# Patient Record
(demographics unavailable — no encounter records)

---

## 2017-12-12 NOTE — RAD
AP VIEW OF THE CHEST:

 

INDICATIONS:

Chest pain.  Left arm tingling.

 

COMPARISON:

Prior exam dated 02/02/2011.

 

IMPRESSION:

No acute cardiopulmonary abnormality.  The examination is not appreciably changed from the comparison
 examination.

 

POS: [facility]

## 2018-03-02 NOTE — RAD
PORTABLE AP CHEST X-RAY

3/2/18

 

HISTORY: 

Cough.

 

COMPARISON:  

12/12/17.

 

FINDINGS:  

The cardiac silhouette and pulmonary vasculature are within normal limits for the portable technique 
of the study. The lungs are clear. Osseous structures are intact. 

 

IMPRESSION:  

No acute cardiopulmonary process. 

 

POS: [facility]

## 2018-03-03 NOTE — PDOC.PN
- Subjective


Encounter Start Date: 03/03/18


Encounter Start Time: 10:00





Patient seen and examined. Productive cough with intermittent wheezing. Fever +

. No overnight events





- Objective


Resuscitation Status: 


 











Resuscitation Status           FULL:Full Resuscitation














MAR Reviewed: Yes


Vital Signs & Weight: 


 Vital Signs (12 hours)











  Temp Pulse Resp BP Pulse Ox


 


 03/03/18 11:20  98.7 F  88  16   95


 


 03/03/18 11:19  98.7 F  88  16  127/82  95


 


 03/03/18 08:30  99.2 F  87  16  


 


 03/03/18 07:12  99.2 F  87  16  124/70  95








 Weight











Weight                         172 lb 5 oz














I&O: 


 











 03/02/18 03/03/18 03/04/18





 06:59 06:59 06:59


 


Intake Total  1090 1685


 


Output Total   500


 


Balance  1090 1185











Result Diagrams: 


 03/03/18 04:10





 03/03/18 04:10


Additional Labs: 


 Accuchecks











  03/03/18 03/03/18 03/03/18





  15:42 11:54 05:53


 


POC Glucose  217 H  92  133 H








 Selected Entries











  03/02/18 03/02/18 03/02/18





  21:26 22:20 23:41


 


Temperature 100.8 F H 100.8 F H 100.6 F H














  03/03/18





  03:26


 


Temperature 99.9 F H











Radiology Reviewed by me: Yes (CXR - no definite infiltrate)


EKG Reviewed by me: Yes (Tele SR)





Phys Exam





- Physical Examination


Constitutional: NAD (ill appearing)


Bibasilar rales/rhonchi. Scat wheezing


Cardiovascular: RRR, no rub


Gastrointestinal: soft, non-tender, positive bowel sounds


Musculoskeletal: no edema


Neurological: moves all 4 limbs





Dx/Plan





- Plan


DVT proph w/SCDs





IMPRESSION:


1. Sepsis due to suspected Pneumonia ?Pneumococcal


2. Recent Influenza A infection - Tamiflu completed this morning


3. DM2


4. GERD


5. Hypothyroidism


6. Hypokalemia





PLAN:


* Cont Levaquin/Rocephin


* Resp viral PCR pending


* AM labs


* CXR in AM


* Cont insulin with sliding scale


* Cont other meds as below


* Replace Potassium


* Change to inpt


* Cont IVF








Review of Systems





- Review of Systems


Constitutional: fever, weakness


Cardiovascular: negative: chest pain, palpitations, orthopnea, paroxysmal 

nocturnal dyspnea, edema, light headedness


Gastrointestinal: negative: Nausea, Vomiting, Abdominal Pain, Diarrhea, 

Constipation, Melena, Hematochezia





- Medications/Allergies


Allergies/Adverse Reactions: 


 Allergies











Allergy/AdvReac Type Severity Reaction Status Date / Time


 


No Known Drug Allergies Allergy   Verified 03/02/18 22:14











Medications: 


 Current Medications





Acetaminophen (Tylenol)  650 mg PO Q4H PRN


   PRN Reason: Headache/Fever or Pain


Hydrocodone Bitart/Acetaminophen (Norco 5/325)  1 tab PO Q4H PRN


   PRN Reason: Moderate Pain (4-6)


Al Hydroxide/Mg Hydroxide (Maalox)  30 ml PO Q6H PRN


   PRN Reason: Heartburn  or Indigestion


Benzonatate (Tessalon)  100 mg PO Q4H PRN


   PRN Reason: Cough


   Last Admin: 03/03/18 15:53 Dose:  100 mg


Dextrose/Water (Dextrose 50%)  25 gm SLOW IVP PRN PRN


   PRN Reason: Hypoglycemia


Glucagon (Glucagon)  1 mg IM PRN PRN


   PRN Reason: Hypoglycemia


Guaifenesin/Dextromethorphan (Robitussin Dm)  15 ml PO Q4H PRN


   PRN Reason: Cough


Levofloxacin 500 mg/ Device  100 mls @ 100 mls/hr IVPB 0900 [facility]


   Last Admin: 03/03/18 08:35 Dose:  100 mls


Sodium Chloride (Normal Saline 0.9%)  1,000 mls @ 100 mls/hr IV .Q10H [facility]


   Last Admin: 03/03/18 10:20 Dose:  1,000 mls


Dextrose/Water (D5w)  1,000 mls @ 0 mls/hr IV .Q0M PRN; As Directed


   PRN Reason: Hypoglycemia


Ceftriaxone Sodium 1 gm/ (Syringe 0.4 ml/ Sterile Water)  10 mls @ 120 mls/hr 

SLOW IVP 2000 [facility]


Levothyroxine Sodium (Synthroid)  100 mcg PO 0600 [facility]


   Last Admin: 03/03/18 05:53 Dose:  100 mcg


Magnesium Hydroxide (Milk Of Magnesium)  30 ml PO DAILYPRN PRN


   PRN Reason: Constipation


Ondansetron HCl (Zofran)  4 mg IVP Q6H PRN


   PRN Reason: Nausea/Vomiting


(Insulin Degludec [ Tresiba Flextouch U- 100] 30 Unit)  0 each SC DAILY [facility]


   Last Admin: 03/03/18 08:34 Dose:  1 each


Dexilant  0 each PO DAILY [facility]


Humalog Pen Patient' (s Home Medication)  0 each SC PRN PRN


   PRN Reason: PERSONAL SLIDING SCALE


   Last Admin: 03/03/18 16:21 Dose:  1 each


Senna (Senokot)  2 tab PO HSPRN PRN


   PRN Reason: Constipation


Valsartan (Diovan)  80 mg PO DAILY [facility]


   Last Admin: 03/03/18 08:33 Dose:  80 mg

## 2018-03-03 NOTE — HP
DATE OF SERVICE:  03/02/2018

 

PRIMARY CARE PHYSICIAN:  Christina Regalado MD

 

REASON FOR ADMISSION:  Acute bronchitis.

 

HISTORY OF PRESENT ILLNESS:  A 43-year-old male with a history of diabetes, hypertension, gastroesoph
ageal reflux disease, and hypothyroidism who presented to emergency room for evaluation of fever.  Ang walker reports that in his family, patient's daughter got sick and he was having flu-like illness and 
subsequently he got sick and he was also experiencing flu-like illness.  On Monday, he was diagnosed 
with influenza A and he was started on Tamiflu.  Tomorrow morning, he will finish his Tamiflu course.
  Despite Tamiflu taking, he was having on and off fever.  Patient saw his primary care physician aga
in a couple of days ago.  At that time, patient was given a dose of Rocephin and he was instructed th
at if he gets fever, then he needs to go to emergency room.  Today, he was having high-grade fever wi
th chills, body ache, and headache.  He was feeling fatigue and that is why he decided to come to the
 emergency room for evaluation.  In the emergency room, this patient had chest x-ray, which did not s
how any acute process and routine blood test was unremarkable.

 

Patient denies any UTI symptoms.  He denies any constipation, diarrhea, melena, or hematochezia.  He 
does have dry cough.  Patient also has cough with deep breathing.  Patient denies any pleurisy.  Paola
ent denies any chest pain, palpitation, or dizziness.  He denies any diarrhea, nausea, or vomiting.  
He denies any sore throat.  He denies any hoarseness of voice.  He denies any headache.

 

ALLERGIES:  No known drug allergy.

 

CURRENT HOME MEDICATIONS:  Synthroid 100 mcg p.o. daily, Tamiflu 75 mg p.o. b.i.d., Diovan 80 mg p.o.
 daily, Humalog insulin as per sliding scale, Tresiba 30 units subcu daily, Dexilant 60 mg p.o. daily
.

 

REVIEW OF SYSTEMS:  The following complete review of systems was negative, unless otherwise mentioned
 in the HPI or below:  Constitutional:  Weight loss or gain, ability to conduct usual activities.  Sk
in:  Rash, itching.  Eyes:  Double vision, pain.  ENT/Mouth:  Nose bleeding, neck stiffness, pain, te
nderness.  Cardiovascular:  Palpitations, dyspnea on exertion, orthopnea.  Respiratory:  Shortness of
 breath, wheezing, cough, hemoptysis, fever or night sweats.  Gastrointestinal:  Poor appetite, abdom
inal pain, heartburn, nausea, vomiting, constipation, or diarrhea.  Genitourinary:  Urgency, frequenc
y, dysuria, nocturia.  Musculoskeletal:  Pain, swelling.  Neurologic/Psychiatric:  Anxiety, depressio
n.  Allergy/Immunologic:  Skin rash, bleeding tendency.  Please my HPI for pertinent positive and neg
ative.

 

PAST MEDICAL HISTORY:  Diabetes type 2, hypertension, hypothyroidism, gastroesophageal reflux disease
.

 

PAST SURGICAL HISTORY:  [place] teeth removal, left fifth digit surgery, tonsillectomy, vasectomy, tin
noidectomy.

 

PAST PSYCHIATRIC HISTORY:  Reviewed and negative.

 

SOCIAL HISTORY:  Patient lives at home with his wife.  No history of tobacco, alcohol, or illicit neptali
g abuse.

 

FAMILY HISTORY:  No strong family history of premature coronary artery disease, stroke, or cancer.

 

EMERGENCY ROOM COURSE:  Patient is given Rocephin, azithromycin, IV fluid.

 

PHYSICAL EXAMINATION:

VITAL SIGNS:  On arrival, blood pressure 113/70, pulse 105, respiratory rate 18, temperature 99.4, sa
turation 98% on room air, weight 78.4 kilograms.

GENERAL:  Patient is currently alert, awake, in no obvious acute distress.

HEAD:  Normocephalic, atraumatic.

EYES:  Pupils round, reactive to light.  Extraocular muscle intact.

ENT:  Oropharynx within normal limit.  Moist mucous membranes.  No oral lesion, no pharyngeal erythem
a, no exudate.

NECK:  Supple.  No JVD, no thyromegaly, no carotid bruit, no jugular venous distention.

LUNGS:  Left-sided few basal rales noted.  No wheeze, no rhonchi, no accessory muscles of respiration
 in use.

CARDIAC:  S1, S2 regular.  No murmur, no gallop, no rub.

ABDOMEN:  Soft, bowel sounds present, nontender, nondistended.  No organomegaly, no mass, no suprapub
ic tenderness.

BACK:  Unremarkable.  No CVA tenderness.

EXTREMITIES:  Upper extremity:  Passive movement of all joints are normal.  Lower extremities:  No ed
ema.  Good peripheral pulsation.

SKIN:  No skin rash.

HEMATOLOGIC:  No lymphadenopathy.

NEUROLOGIC:  Nonfocal examination.

 

SIGNIFICANT LABORATORY DATA:  CBC, WBC 7.8, hemoglobin 16.1, platelet 169,000.  VBG, pH 7.42, bicarbo
sumi 26.4, CO2 of 40, O2 of 64.  BMP, sodium 137, potassium 3.8, chloride 102, carbon dioxide 24, BUN
 10, creatinine 0.81.  Glucose 164, calcium 8.8.  Lactic acid 1.4.  LFT, AST 12, ALT 16, alkaline west
sphatase 72, albumin 3.9.

 

ASSESSMENT AND PLAN:

1.  Acute bronchitis/early community-acquired pneumonia after influenza.  The patient will be given R
ocephin and levofloxacin while in hospital, symptomatic treatment for cough with Robitussin and Pamela
duncan.

2.  Gastroesophageal reflux disease.  We will continue Pepcid 20 mg p.o. b.i.d.

3.  Diabetes type 2.  We will continue insulin as per sliding scale per protocol.  Diabetic diet will
 be given and we will continue insulin as per sliding scale.

4.  Hypertension.  If blood pressure permits, then we will continue valsartan 80 mg p.o. daily.

5.  Hypothyroidism.  Continue Synthroid 100 mcg p.o. daily.

6.  Current history of influenza A.  Continue Tamiflu 75 mg p.o. daily, tomorrow morning is a last do
se of Tamiflu.

 

If patient remains afebrile, then we will consider discharging him home on p.o. Levaquin therapy.  De
ep venous thrombosis prophylaxis not needed because we are expecting discharge in 24 hours.  Gastroin
testinal prophylaxis, Pepcid 20 mg p.o. b.i.d.

 

CODE STATUS:  The patient is FULL CODE.  The patient is making decision by himself.

 

Disposition plan based on clinical course.  We are expecting patient's stay in the hospital 24 hours.
  Plan of care discussed with the patient and family member at bedside.

## 2018-03-04 NOTE — RAD
PA AND LATERAL OF THE CHEST:

 

INDICATION: 

Concern for pneumonia.

 

COMPARISON: 

Prior exam dated 3/2/18.

 

FINDINGS: 

No airspace consolidation is noted.  Cardiomediastinal silhouette is within normal limits.  No acute 
osseous abnormality is noted.

 

IMPRESSION: 

No acute cardiopulmonary abnormality.

 

POS: [facility]

## 2018-03-04 NOTE — PDOC.PN
- Subjective


Encounter Start Date: 03/04/18


Encounter Start Time: 12:23


Subjective: feels better but weak and still w cough,no fever/chills/SOB





- Objective


Resuscitation Status: 


 











Resuscitation Status           FULL:Full Resuscitation














MAR Reviewed: Yes


Vital Signs & Weight: 


 Vital Signs (12 hours)











  Temp Pulse Resp BP Pulse Ox


 


 03/04/18 12:10  98.2 F  86  18  134/88  94 L


 


 03/04/18 08:00  98.3 F  78  18  131/85  94 L


 


 03/04/18 04:00  98.3 F  74  16  135/86  96








 Weight











Weight                         172 lb 5 oz














I&O: 


 











 03/03/18 03/04/18 03/05/18





 06:59 06:59 06:59


 


Intake Total 1090 1685 


 


Output Total  500 


 


Balance 1090 1185 











Result Diagrams: 


 03/04/18 03:29





 03/04/18 03:29


Additional Labs: 


 Accuchecks











  03/04/18 03/03/18 03/03/18





  04:31 19:29 15:42


 


POC Glucose  193 H  296 H  217 H














  03/03/18





  11:54


 


POC Glucose  92








Microbiology





03/03/18 00:32   Nasopharyngeal swab   Respiratory Virus Panel (PCR) ([facility]) - 

Final


03/02/18 19:03   Venous blood - Left Arm   Blood Culture - Preliminary


                              Specimen has been received and culture in 

progress.


                              No Growth to date.


03/02/18 18:56   Venous blood - Right Arm   Blood Culture - Preliminary


                              Specimen has been received and culture in 

progress.


                              No Growth to date.











Phys Exam





- Physical Examination


Constitutional: NAD


HEENT: PERRLA, moist MMs, sclera anicteric, oral pharynx no lesions


Neck: no nodes, no JVD, supple, full ROM


Respiratory: no wheezing, no rales, no rhonchi, clear to auscultation bilateral


Cardiovascular: RRR, no significant murmur


Gastrointestinal: soft, non-tender, no distention, positive bowel sounds


Musculoskeletal: no edema, pulses present


Neurological: non-focal, normal sensation, moves all 4 limbs


Psychiatric: normal affect, A&O x 3


Skin: no rash





Dx/Plan


(1) Influenza A


Code(s): J10.1 - FLU DUE TO OTH IDENT INFLUENZA VIRUS W OTH RESP MANIFEST   

Status: Acute   Comment: s/p tamiflu X5 days   





(2) Sepsis


Code(s): A41.9 - SEPSIS, UNSPECIFIED ORGANISM   Status: Acute   





(3) Hypothyroid


Code(s): E03.9 - HYPOTHYROIDISM, UNSPECIFIED   Status: Chronic   





(4) GERD (gastroesophageal reflux disease)


Code(s): K21.9 - GASTRO-ESOPHAGEAL REFLUX DISEASE WITHOUT ESOPHAGITIS   Status: 

Chronic   





(5) DM2 (diabetes mellitus, type 2)


Status: Chronic   





- Plan


plan discussed w/ family, out of bed/ambulate, DVT proph w/SCDs


DC IVF.encourage ambulation,oral intake


-: CXR does not show PNA.symptoma likley d/t secd bacterial bronchitis


-: cont IV ABx for 1 more day.likley home tomorrow.


-: check stills for C.Diff





* .








Review of Systems





- Review of Systems


Constitutional: weakness.  negative: fever, chills, sweats, malaise, other


Eyes: negative: Pain, Vision Change, Conjunctivae Inflammation, Eyelid 

Inflammation, Redness, Other


ENT: negative: Ear Pain, Ear Discharge, Nose Pain, Nose Discharge, Nose 

Congestion, Mouth Pain, Mouth Swelling, Throat Pain, Throat Swelling, Other


Respiratory: Cough.  negative: Dry, Shortness of Breath, Hemoptysis, SOB with 

Excertion, Pleuritic Pain, Sputum, Wheezing


Cardiovascular: negative: chest pain, palpitations, orthopnea, paroxysmal 

nocturnal dyspnea, edema, light headedness, other


Gastrointestinal: negative: Nausea, Vomiting, Abdominal Pain, Diarrhea, 

Constipation, Melena, Hematochezia, Other


Genitourinary: negative: Dysuria, Frequency, Incontinence, Hematuria, Retention

, Other


Musculoskeletal: negative: Neck Pain, Shoulder Pain, Arm Pain, Back Pain, Hand 

Pain, Leg Pain, Foot Pain, Other


Skin: negative: Rash, Lesions, Eber, Bruising, Other


Neurological: negative: Weakness, Numbness, Incoordination, Change in Speech, 

Confusion, Seizures, Other





- Medications/Allergies


Allergies/Adverse Reactions: 


 Allergies











Allergy/AdvReac Type Severity Reaction Status Date / Time


 


No Known Drug Allergies Allergy   Verified 03/02/18 22:14











Medications: 


 Current Medications





Acetaminophen (Tylenol)  650 mg PO Q4H PRN


   PRN Reason: Headache/Fever or Pain


Hydrocodone Bitart/Acetaminophen (Norco 5/325)  1 tab PO Q4H PRN


   PRN Reason: Moderate Pain (4-6)


Al Hydroxide/Mg Hydroxide (Maalox)  30 ml PO Q6H PRN


   PRN Reason: Heartburn  or Indigestion


Albuterol/Ipratropium (Duoneb)  3 ml NEB [id number] PRN


   PRN Reason: SOB &/or Wheezing


Benzonatate (Tessalon)  100 mg PO Q4H PRN


   PRN Reason: Cough


   Last Admin: 03/04/18 07:55 Dose:  100 mg


Dextrose/Water (Dextrose 50%)  25 gm SLOW IVP PRN PRN


   PRN Reason: Hypoglycemia


Glucagon (Glucagon)  1 mg IM PRN PRN


   PRN Reason: Hypoglycemia


Guaifenesin/Dextromethorphan (Robitussin Dm)  15 ml PO Q4H PRN


   PRN Reason: Cough


   Last Admin: 03/04/18 07:55 Dose:  15 ml


Levofloxacin 500 mg/ Device  100 mls @ 100 mls/hr IVPB 0900 [facility]


   Last Admin: 03/04/18 07:56 Dose:  100 mls


Dextrose/Water (D5w)  1,000 mls @ 0 mls/hr IV .Q0M PRN; As Directed


   PRN Reason: Hypoglycemia


Ceftriaxone Sodium 1 gm/ (Syringe 0.4 ml/ Sterile Water)  10 mls @ 120 mls/hr 

SLOW IVP 2000 [facility]


   Last Admin: 03/03/18 19:47 Dose:  10 mls


Levothyroxine Sodium (Synthroid)  100 mcg PO 0600 [facility]


   Last Admin: 03/04/18 05:29 Dose:  100 mcg


Magnesium Hydroxide (Milk Of Magnesium)  30 ml PO DAILYPRN PRN


   PRN Reason: Constipation


Ondansetron HCl (Zofran)  4 mg IVP Q6H PRN


   PRN Reason: Nausea/Vomiting


(Insulin Degludec [ Tresiba Flextouch U- 100] 30 Unit)  0 each SC DAILY [facility]


   Last Admin: 03/04/18 07:55 Dose:  1 each


Dexilant  0 each PO DAILY [facility]


   Last Admin: 03/04/18 07:55 Dose:  1 each


Humalog Pen Patient' (s Home Medication)  0 each SC PRN PRN


   PRN Reason: PERSONAL SLIDING SCALE


   Last Admin: 03/03/18 19:48 Dose:  1 each


Potassium Chloride (Klor-Con 10)  10 meq PO QAM-WM [facility]


   Last Admin: 03/04/18 07:56 Dose:  10 meq


Saccharomyces Boulardii (Florastor)  250 mg PO DAILY [facility]


   Last Admin: 03/04/18 07:56 Dose:  250 mg


Senna (Senokot)  2 tab PO HSPRN PRN


   PRN Reason: Constipation


Valsartan (Diovan)  80 mg PO DAILY [facility]


   Last Admin: 03/04/18 08:40 Dose:  80 mg

## 2018-03-05 NOTE — DIS
DATE OF ADMISSION:  03/03/2018

 

DATE OF DISCHARGE:  03/05/2018

 

CONDITION AT THE TIME OF DISCHARGE:  Stable and improved.

 

DISCHARGE DISPOSITION:  Home.

 

PRIMARY CARE PHYSICIAN:  Christina Regalado MD.

 

DISCHARGE DIAGNOSES:

1.  Acute bronchitis post-influenza.

2.  Sepsis secondary to influenza A.

3.  Hypothyroidism.

4.  Diabetes mellitus.

5.  Gastroesophageal reflux disease.

 

DISCHARGE MEDICATIONS:  Levofloxacin 500 mg p.o. daily for 5 days, Florastor 250 mg p.o. daily for 10
 days, and Mucinex 600 mg p.o. b.i.d. for 5 days.

 

Resume home medications as follows:  Levothyroxine 100 mcg daily, Tresiba 30 units daily, Dexilant 60
 mg daily, Diovan 80 mg daily, and Humalog insulin sliding scale three times with meals.

 

HISTORY OF PRESENTING ILLNESS:  Mr. Garland is a 43-year-old male with past medical history of diab
etes, hypertension, and GERD, who presented to the emergency room for complaints of fever.  Prior to 
presentation, he was diagnosed with influenza A and was started on Tamiflu and he is almost done with
 his Tamiflu on presentation, but still having on and off fever and not feeling well.  He was feeling
 fatigued and came to the emergency room.  He did not have any evidence of pneumonia on the chest x-r
ay upon presentation and routine blood tests were unremarkable.  There was concern of some pneumonia,
 clinically, so he was admitted for IV antibiotics.  Please see admission history and physical for fu
rther details.

 

HOSPITAL COURSE:  The patient did remarkable for the rest of his hospitalization.  He recovered quick
ly.  Chest x-ray was repeated and was once again unremarkable without any evidence of edema, effusion
, or pneumonia.  His Tamiflu was finished and he was transitioned from IV to oral antibiotics.  Respi
ratory viral PCR was repeated, it was once again positive for influenza A.  His Clostridium difficile
 was checked because of some concern for loose stool, which was negative.

 

By the time of discharge, he is back to his baseline and has no fever and is clinically better.  He w
as seen and examined prior to discharge.

 

PHYSICAL EXAMINATION:  His physical exam this morning include,

VITAL SIGNS:  Temperature 98, pulse of 86, respirations 16, saturating 96% on room air, blood pressur
e 124/85.

GENERAL:  No acute distress, awake, alert, oriented x3.

CHEST:  Clear to auscultation without any wheezing, rales or rhonchi.  Rate and rhythm is regular wit
hout any murmur, rubs, or gallops.

 

LABORATORY DATA:  CBC shows WBCs at 4.8, hemoglobin 13.8, platelet count of 151.  Serum chemistries s
how blood sugar of 173, otherwise unremarkable.  His lactic acid upon presentation was 1.4 and his CD
4 count is 550.

 

He will follow up with his primary care physician in 5-7 days.